# Patient Record
Sex: MALE | Race: WHITE
[De-identification: names, ages, dates, MRNs, and addresses within clinical notes are randomized per-mention and may not be internally consistent; named-entity substitution may affect disease eponyms.]

---

## 2017-05-01 ENCOUNTER — HOSPITAL ENCOUNTER (OUTPATIENT)
Dept: HOSPITAL 62 - SP | Age: 62
End: 2017-05-01
Attending: FAMILY MEDICINE
Payer: COMMERCIAL

## 2017-05-01 DIAGNOSIS — I80.221: Primary | ICD-10-CM

## 2017-05-01 PROCEDURE — 93971 EXTREMITY STUDY: CPT

## 2018-01-16 ENCOUNTER — HOSPITAL ENCOUNTER (OUTPATIENT)
Dept: HOSPITAL 62 - END | Age: 63
Discharge: HOME | End: 2018-01-16
Attending: SURGERY
Payer: COMMERCIAL

## 2018-01-16 VITALS — SYSTOLIC BLOOD PRESSURE: 146 MMHG | DIASTOLIC BLOOD PRESSURE: 79 MMHG

## 2018-01-16 DIAGNOSIS — K64.8: ICD-10-CM

## 2018-01-16 DIAGNOSIS — K62.5: ICD-10-CM

## 2018-01-16 DIAGNOSIS — Z79.82: ICD-10-CM

## 2018-01-16 DIAGNOSIS — D12.0: ICD-10-CM

## 2018-01-16 DIAGNOSIS — K31.9: Primary | ICD-10-CM

## 2018-01-16 DIAGNOSIS — Z79.899: ICD-10-CM

## 2018-01-16 DIAGNOSIS — E78.00: ICD-10-CM

## 2018-01-16 DIAGNOSIS — D12.3: ICD-10-CM

## 2018-01-16 DIAGNOSIS — K20.9: ICD-10-CM

## 2018-01-16 LAB
ERYTHROCYTE [DISTWIDTH] IN BLOOD BY AUTOMATED COUNT: 14.1 % (ref 11.5–14)
HCT VFR BLD CALC: 38.2 % (ref 37.9–51)
HGB BLD-MCNC: 12.8 G/DL (ref 13.5–17)
MCH RBC QN AUTO: 28.2 PG (ref 27–33.4)
MCHC RBC AUTO-ENTMCNC: 33.4 G/DL (ref 32–36)
MCV RBC AUTO: 84 FL (ref 80–97)
PLATELET # BLD: 219 10^3/UL (ref 150–450)
RBC # BLD AUTO: 4.53 10^6/UL (ref 4.35–5.55)
WBC # BLD AUTO: 4.9 10^3/UL (ref 4–10.5)

## 2018-01-16 PROCEDURE — 36415 COLL VENOUS BLD VENIPUNCTURE: CPT

## 2018-01-16 PROCEDURE — 0DB98ZX EXCISION OF DUODENUM, VIA NATURAL OR ARTIFICIAL OPENING ENDOSCOPIC, DIAGNOSTIC: ICD-10-PCS | Performed by: SURGERY

## 2018-01-16 PROCEDURE — 0DB48ZX EXCISION OF ESOPHAGOGASTRIC JUNCTION, VIA NATURAL OR ARTIFICIAL OPENING ENDOSCOPIC, DIAGNOSTIC: ICD-10-PCS | Performed by: SURGERY

## 2018-01-16 PROCEDURE — 88342 IMHCHEM/IMCYTCHM 1ST ANTB: CPT

## 2018-01-16 PROCEDURE — 45380 COLONOSCOPY AND BIOPSY: CPT

## 2018-01-16 PROCEDURE — 88305 TISSUE EXAM BY PATHOLOGIST: CPT

## 2018-01-16 PROCEDURE — 0DBH8ZX EXCISION OF CECUM, VIA NATURAL OR ARTIFICIAL OPENING ENDOSCOPIC, DIAGNOSTIC: ICD-10-PCS | Performed by: SURGERY

## 2018-01-16 PROCEDURE — 46600 DIAGNOSTIC ANOSCOPY SPX: CPT

## 2018-01-16 PROCEDURE — 85027 COMPLETE CBC AUTOMATED: CPT

## 2018-01-16 PROCEDURE — 0DB68ZX EXCISION OF STOMACH, VIA NATURAL OR ARTIFICIAL OPENING ENDOSCOPIC, DIAGNOSTIC: ICD-10-PCS | Performed by: SURGERY

## 2018-01-16 PROCEDURE — 46221 LIGATION OF HEMORRHOID(S): CPT

## 2018-01-16 PROCEDURE — 43239 EGD BIOPSY SINGLE/MULTIPLE: CPT

## 2018-01-16 PROCEDURE — 45385 COLONOSCOPY W/LESION REMOVAL: CPT

## 2018-01-16 PROCEDURE — 06LY4CC OCCLUSION OF HEMORRHOIDAL PLEXUS WITH EXTRALUMINAL DEVICE, PERCUTANEOUS ENDOSCOPIC APPROACH: ICD-10-PCS | Performed by: SURGERY

## 2018-01-16 PROCEDURE — 0DBL8ZX EXCISION OF TRANSVERSE COLON, VIA NATURAL OR ARTIFICIAL OPENING ENDOSCOPIC, DIAGNOSTIC: ICD-10-PCS | Performed by: SURGERY

## 2018-01-16 RX ADMIN — FENTANYL CITRATE ONE MCG: 50 INJECTION INTRAMUSCULAR; INTRAVENOUS at 10:17

## 2018-01-16 RX ADMIN — MIDAZOLAM HYDROCHLORIDE ONE MG: 1 INJECTION, SOLUTION INTRAMUSCULAR; INTRAVENOUS at 10:15

## 2018-01-16 RX ADMIN — FENTANYL CITRATE ONE MCG: 50 INJECTION INTRAMUSCULAR; INTRAVENOUS at 10:09

## 2018-01-16 RX ADMIN — MIDAZOLAM HYDROCHLORIDE ONE MG: 1 INJECTION, SOLUTION INTRAMUSCULAR; INTRAVENOUS at 10:07

## 2018-01-16 NOTE — OPERATIVE REPORT
Operative Report


DATE OF SURGERY: 01/16/18


PREOPERATIVE DIAGNOSIS: Abdominal pain.  Blood per rectum.  History of colon 

polyps.


POSTOPERATIVE DIAGNOSIS: Gastroduodenitis.  Cecal polyp.  Proximal transverse 

colon polyp.


OPERATION: Esophagogastroduodenoscopy.  Gastric and duodenal and 

gastroesophageal junction biopsies.  Colonoscopy with cold polypectomy of cecal 

polyp.  Snare polypectomy of transverse colon polyp.  Anoscopy with 

hemorrhoidal bandings 3.


SURGEON: YOEL BRANDT


ANESTHESIA: Moderate Sedation


TISSUE REMOVED OR ALTERED: Duodenum biopsy.  Prepyloric biopsy.  

Gastroesophageal junction biopsy.  Cecal polyp.  Proximal transverse colon 

polyp.  3 column internal hemorrhoids banded.


COMPLICATIONS: 





None


ESTIMATED BLOOD LOSS: Minimal


INTRAOPERATIVE FINDINGS: Erosions and erythema at the prepyloric region.  

Duodenal erosions and nodularity at the duodenal bulb.  Mucosal irregularity at 

the gastroesophageal junction.  Diminutive polyp at the cecum.  Half centimeter 

sessile polyp at the proximal transverse colon.  3 column internal hemorrhoids.


PROCEDURE: 





Informed consent was obtained.  Patient was brought to the endoscopy suite.  IV 

sedation with Versed and fentanyl was administered.  Endoscope was passed via 

the patient's mouth it was fed down to the second portion of the duodenum.  The 

duodenal bulb was markedly erythematous with the nodularity consistent with 

duodenitis.  Duodenal biopsies were performed.  At the prepyloric region there 

was superficial erosions and erythema but no masses nor ulceration.  This 

region was biopsied.  Remainder of the stomach appeared normal.  At the 

gastroesophageal junction there was some mucosal irregularity which were 

biopsied at 4 quadrants.  Remainder of the esophagus appeared normal.





Patient was repositioned.  Digital rectal exam revealed no palpable perianal 

masses.  Endoscope was passed via the patient's anus it was fed to the cecum.  

The bowel prep was good and visualization was good.  At the cecum there was a 

diminutive polyp which was cold polypectomy.  Specimen was submitted to 

pathology.  Remainder of the right colon appeared normal.  At the proximal 

aspect of the transverse colon there was 1/2 cm sessile polyp which was snare 

polypectomy need and the specimen retrieved and submitted to pathology.  

Remainder of the transverse colon appeared normal.  Descending colon sigmoid 

colon the rectum also appeared normal.





Anoscopy was performed which demonstrated prominent 3 column internal 

hemorrhoids.  Each of these hemorrhoidal complexes were banded using the 

suction banding device taking great care to place the bands above the level of 

the dentate line.  The right anterior, right posterior, and the left lateral 

hemorrhoidal complexes were all banded.  Patient tolerated procedure well with 

no apparent complications and was taken to the recovery area in stable 

condition.





Evidence of gastroduodenitis seen.  Await biopsy reports.  Mucosal irregularity 

seen at the gastroesophageal junction.  Will await biopsy report.  Patient with 

cecal polyp and proximal transverse colon polyp both removed.  We will follow-

up with the patient with the biopsy results.  Patient status post hemorrhoidal 

banding.

## 2018-01-16 NOTE — PDOC DISCHARGE SUMMARY
Discharge Summary (SDC)





- Discharge


Final Diagnosis: 





Gastro duodenitis.  Cecal and proximal transverse colon polyps.  3 column 

internal hemorrhoids.


Date of Surgery: 01/16/18


Discharge Date: 01/16/18


Condition: Good


Treatment or Instructions: 


Underwent EGD with biopsies.  Colonoscopy with polypectomies.  Anoscopy with 

hemorrhoidal banding 3.  May discharge patient home when met discharge 

criteria.  Follow-up with me next week.  Stay active but avoid strenuous 

activity.  Call me for fever, inability to urinate or severe perianal pain.


Prescriptions: 


Docusate Sodium [Colace 100 mg Capsule] 100 mg PO BID #60 capsule


Oxycodone HCl/Acetaminophen [Percocet 5-325 mg Tablet] 1 tab PO ASDIR PRN #20 

tablet


 PRN Reason: 


Referrals: 


ANDRE LOUIS MD [Primary Care Provider] - 


Discharge Diet: As Tolerated


Discharge Activity: Activity As Tolerated - Stay active but avoid strenuous 

activity.


Report the Following to Your Physician Immediately: Increase in Pain - Normal 

to have pressure discomfort but not normal to have excruciating pain, Fever 

over 101 Degrees, Unusual Bleeding - Normal to have small amounts of bleeding


Other Items to Report to MD: Inability to urinate.

## 2020-01-16 ENCOUNTER — HOSPITAL ENCOUNTER (OUTPATIENT)
Dept: HOSPITAL 62 - OD | Age: 65
End: 2020-01-16
Attending: FAMILY MEDICINE
Payer: COMMERCIAL

## 2020-01-16 DIAGNOSIS — E03.4: Primary | ICD-10-CM

## 2020-01-16 DIAGNOSIS — E78.2: ICD-10-CM

## 2020-01-16 DIAGNOSIS — R20.2: ICD-10-CM

## 2020-01-16 LAB
CHOLEST SERPL-MCNC: 184.6 MG/DL (ref 0–200)
LDLC SERPL DIRECT ASSAY-MCNC: 118 MG/DL (ref ?–100)
TRIGL SERPL-MCNC: 97 MG/DL (ref ?–150)
VLDLC SERPL CALC-MCNC: 19 MG/DL (ref 10–31)

## 2020-01-16 PROCEDURE — 36415 COLL VENOUS BLD VENIPUNCTURE: CPT

## 2020-01-16 PROCEDURE — 82607 VITAMIN B-12: CPT

## 2020-01-16 PROCEDURE — 84443 ASSAY THYROID STIM HORMONE: CPT

## 2020-01-16 PROCEDURE — 83036 HEMOGLOBIN GLYCOSYLATED A1C: CPT

## 2020-01-16 PROCEDURE — 80061 LIPID PANEL: CPT
